# Patient Record
Sex: FEMALE | Race: WHITE | Employment: OTHER | URBAN - METROPOLITAN AREA
[De-identification: names, ages, dates, MRNs, and addresses within clinical notes are randomized per-mention and may not be internally consistent; named-entity substitution may affect disease eponyms.]

---

## 2021-10-24 ENCOUNTER — HOSPITAL ENCOUNTER (EMERGENCY)
Age: 36
Discharge: HOME | End: 2021-10-25
Attending: EMERGENCY MEDICINE | Admitting: EMERGENCY MEDICINE

## 2021-10-24 DIAGNOSIS — M54.6 ACUTE BILATERAL THORACIC BACK PAIN: Primary | ICD-10-CM

## 2021-10-24 DIAGNOSIS — R10.819 ABDOMINAL TENDERNESS IN FLANK: ICD-10-CM

## 2021-10-24 LAB
ALBUMIN SERPL-MCNC: 4.1 G/DL (ref 3.5–5)
ALBUMIN/GLOB SERPL: 1.1 {RATIO} (ref 1.1–2.2)
ALP SERPL-CCNC: 79 U/L (ref 45–117)
ALT SERPL-CCNC: 17 U/L (ref 12–78)
ANION GAP SERPL CALC-SCNC: 6 MMOL/L (ref 5–15)
APPEARANCE UR: CLEAR
AST SERPL-CCNC: 11 U/L (ref 15–37)
BACTERIA URNS QL MICRO: NEGATIVE /HPF
BASOPHILS # BLD: 0.1 K/UL (ref 0–0.1)
BASOPHILS NFR BLD: 1 % (ref 0–1)
BILIRUB SERPL-MCNC: 0.3 MG/DL (ref 0.2–1)
BILIRUB UR QL: NEGATIVE
BUN SERPL-MCNC: 13 MG/DL (ref 6–20)
BUN/CREAT SERPL: 18 (ref 12–20)
CALCIUM SERPL-MCNC: 9.4 MG/DL (ref 8.5–10.1)
CHLORIDE SERPL-SCNC: 104 MMOL/L (ref 97–108)
CO2 SERPL-SCNC: 27 MMOL/L (ref 21–32)
COLOR UR: NORMAL
COMMENT, HOLDF: NORMAL
CREAT SERPL-MCNC: 0.71 MG/DL (ref 0.55–1.02)
DIFFERENTIAL METHOD BLD: NORMAL
EOSINOPHIL # BLD: 0.2 K/UL (ref 0–0.4)
EOSINOPHIL NFR BLD: 3 % (ref 0–7)
EPITH CASTS URNS QL MICRO: NORMAL /LPF
ERYTHROCYTE [DISTWIDTH] IN BLOOD BY AUTOMATED COUNT: 12.6 % (ref 11.5–14.5)
GLOBULIN SER CALC-MCNC: 3.9 G/DL (ref 2–4)
GLUCOSE SERPL-MCNC: 96 MG/DL (ref 65–100)
GLUCOSE UR STRIP.AUTO-MCNC: NEGATIVE MG/DL
HCT VFR BLD AUTO: 42.8 % (ref 35–47)
HGB BLD-MCNC: 13.5 G/DL (ref 11.5–16)
HGB UR QL STRIP: NEGATIVE
HYALINE CASTS URNS QL MICRO: NORMAL /LPF (ref 0–5)
IMM GRANULOCYTES # BLD AUTO: 0 K/UL (ref 0–0.04)
IMM GRANULOCYTES NFR BLD AUTO: 0 % (ref 0–0.5)
KETONES UR QL STRIP.AUTO: NEGATIVE MG/DL
LEUKOCYTE ESTERASE UR QL STRIP.AUTO: NEGATIVE
LIPASE SERPL-CCNC: 133 U/L (ref 73–393)
LYMPHOCYTES # BLD: 3.3 K/UL (ref 0.8–3.5)
LYMPHOCYTES NFR BLD: 38 % (ref 12–49)
MCH RBC QN AUTO: 28.4 PG (ref 26–34)
MCHC RBC AUTO-ENTMCNC: 31.5 G/DL (ref 30–36.5)
MCV RBC AUTO: 90.1 FL (ref 80–99)
MONOCYTES # BLD: 0.7 K/UL (ref 0–1)
MONOCYTES NFR BLD: 8 % (ref 5–13)
NEUTS SEG # BLD: 4.4 K/UL (ref 1.8–8)
NEUTS SEG NFR BLD: 50 % (ref 32–75)
NITRITE UR QL STRIP.AUTO: NEGATIVE
NRBC # BLD: 0 K/UL (ref 0–0.01)
NRBC BLD-RTO: 0 PER 100 WBC
PH UR STRIP: 7 [PH] (ref 5–8)
PLATELET # BLD AUTO: 306 K/UL (ref 150–400)
PMV BLD AUTO: 10.1 FL (ref 8.9–12.9)
POTASSIUM SERPL-SCNC: 3.4 MMOL/L (ref 3.5–5.1)
PROT SERPL-MCNC: 8 G/DL (ref 6.4–8.2)
PROT UR STRIP-MCNC: NEGATIVE MG/DL
RBC # BLD AUTO: 4.75 M/UL (ref 3.8–5.2)
RBC #/AREA URNS HPF: NORMAL /HPF (ref 0–5)
SAMPLES BEING HELD,HOLD: NORMAL
SODIUM SERPL-SCNC: 137 MMOL/L (ref 136–145)
SP GR UR REFRACTOMETRY: <1.005 (ref 1–1.03)
UR CULT HOLD, URHOLD: NORMAL
UROBILINOGEN UR QL STRIP.AUTO: 0.2 EU/DL (ref 0.2–1)
WBC # BLD AUTO: 8.8 K/UL (ref 3.6–11)
WBC URNS QL MICRO: NORMAL /HPF (ref 0–4)

## 2021-10-24 PROCEDURE — 83690 ASSAY OF LIPASE: CPT

## 2021-10-24 PROCEDURE — 81001 URINALYSIS AUTO W/SCOPE: CPT

## 2021-10-24 PROCEDURE — 99284 EMERGENCY DEPT VISIT MOD MDM: CPT

## 2021-10-24 PROCEDURE — 85025 COMPLETE CBC W/AUTO DIFF WBC: CPT

## 2021-10-24 PROCEDURE — 80053 COMPREHEN METABOLIC PANEL: CPT

## 2021-10-25 VITALS
OXYGEN SATURATION: 100 % | DIASTOLIC BLOOD PRESSURE: 77 MMHG | RESPIRATION RATE: 16 BRPM | SYSTOLIC BLOOD PRESSURE: 127 MMHG | TEMPERATURE: 98.3 F | HEART RATE: 65 BPM

## 2021-10-25 LAB — HCG UR QL: NEGATIVE

## 2021-10-25 PROCEDURE — 81025 URINE PREGNANCY TEST: CPT

## 2021-10-25 PROCEDURE — 74011250637 HC RX REV CODE- 250/637: Performed by: PHYSICIAN ASSISTANT

## 2021-10-25 RX ORDER — IBUPROFEN 400 MG/1
800 TABLET ORAL
Status: COMPLETED | OUTPATIENT
Start: 2021-10-25 | End: 2021-10-25

## 2021-10-25 RX ADMIN — IBUPROFEN 800 MG: 400 TABLET ORAL at 00:36

## 2021-10-25 NOTE — ED TRIAGE NOTES
Pt arrives with CC of mid-upper back pain starting at 130p today that started suddenly after a road trip. She feels like it is muscle spasms. Denies difficulty voiding, frequency/urgency, but she was also concerned about a kidney issue. She is also having some abd pain, mostly R sided, sometimes associated with the back pain. She complains of some nausea and chills.

## 2021-10-25 NOTE — DISCHARGE INSTRUCTIONS
Return for new or worsening symptoms such as fever, worsening pain with blood in your urine, painful urination, increased abdominal pain, vomiting, chest pain, shortness of breath, coughing up blood. If you choose to take medication, you may take ibuprofen, 2 to 3 tablets every 6-8 hours, or Tylenol, 500 mg every 4-6 hours, as needed for pain. Follow-up with primary care.

## 2021-10-25 NOTE — ED PROVIDER NOTES
29year old F presenting to the ED for back pain. Started relatively abruptly after driving home from [address] today. Notes that she got out of her car, walked around to get her stuff, and had acute onset of sharp pain that then escalated. Thought it was muscular, tried a foam roller, notes that pain has persisted. Notes that she had slight relief of pain when she ate soup.  + nausea, felt a little woozy. Also notes some abdominal discomfort. Tried to sleep but was too uncomfortable. + chills, no fever. Points to the left side of the abdomen, but when she has the back pain the abd pain is in the RUQ; mid back, into the ribs and torso. Notes that pain is worse with movement, twisting, lifting the arms. Denies dysuria, hematuria, hesitancy - notes that earlier had some slight worsening of back pain with urination. Just finished menstrual cycle. No meds PTA. Declines pain medication at this time. PMHx: denies  PSx: wisdom teeth, minor gynecologic procedures  Social: non-smoker. 0-1 drinks a month. 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 Massage therapist.  Allergies: PCN, amox           No past medical history on file. No past surgical history on file. No family history on file.     Social History     Socioeconomic History    Marital status: SINGLE     Spouse name: Not on file    Number of children: Not on file    Years of education: Not on file    Highest education level: Not on file   Occupational History    Not on file   Tobacco Use    Smoking status: Not on file   Substance and Sexual Activity    Alcohol use: Not on file    Drug use: Not on file    Sexual activity: Not on file   Other Topics Concern    Not on file   Social History Narrative    Not on file     Social Determinants of Health     Financial Resource Strain:     Difficulty of Paying Living Expenses:    Food Insecurity:     Worried About Running Out of Food in the Last Year:     [address] in the Last Year:    Transportation Needs:     Lack of Transportation (Medical):  Lack of Transportation (Non-Medical):    Physical Activity:     Days of Exercise per Week:     Minutes of Exercise per Session:    Stress:     Feeling of Stress :    Social Connections:     Frequency of Communication with Friends and Family:     Frequency of Social Gatherings with Friends and Family:     Attends [employer] Services:     Active Member of Clubs or Organizations:     Attends Club or Organization Meetings:     Marital Status:    Intimate Partner Violence:     Fear of Current or Ex-Partner:     Emotionally Abused:     Physically Abused:     Sexually Abused: ALLERGIES: Amoxicillin and Penicillins    Review of Systems   Constitutional: Negative for fever. HENT: Negative for facial swelling. Respiratory: Negative for shortness of breath. Cardiovascular: Negative for chest pain. Gastrointestinal: Positive for abdominal pain. Negative for vomiting. Genitourinary: Positive for flank pain. Musculoskeletal: Positive for back pain. Skin: Negative for wound. Neurological: Negative for syncope. All other systems reviewed and are negative. Vitals:    10/24/21 2236   BP: (!) 145/83   Pulse: 78   Resp: 18   Temp: 98.3 °F (36.8 °C)   SpO2: 100%            Physical Exam  Vitals and nursing note reviewed. Constitutional:       General: She is not in acute distress. Appearance: She is well-developed. Comments: Pleasant white female, no distress   HENT:      Head: Normocephalic and atraumatic. Right Ear: External ear normal.      Left Ear: External ear normal.   Eyes:      General: No scleral icterus. Conjunctiva/sclera: Conjunctivae normal.   Neck:      Trachea: No tracheal deviation. Cardiovascular:      Rate and Rhythm: Normal rate and regular rhythm. Heart sounds: Normal heart sounds. No murmur heard. No friction rub. No gallop.     Pulmonary:      Effort: Pulmonary effort is normal. No respiratory distress. Breath sounds: Normal breath sounds. No stridor. No wheezing. Abdominal:      General: There is no distension. Palpations: Abdomen is soft. Comments: Mild tenderness over both flanks  Abdomen is soft, very mild epigastric tenderness to deep palpation, no right upper or left upper quadrant tenderness, negative Maxwell sign   Musculoskeletal:         General: Normal range of motion. Cervical back: Neck supple. Back:    Skin:     General: Skin is warm and dry. Neurological:      Mental Status: She is alert and oriented to person, place, and time. Psychiatric:         Behavior: Behavior normal.          MDM  Number of Diagnoses or Management Options  Abdominal tenderness in flank  Acute bilateral thoracic back pain  Diagnosis management comments: 43-year-old female presenting to the ED for 1 day history of lower thoracic bilateral back pain, especially worse with bending, twisting, movement of the arms. Given the patient complained of some nausea, intermittent abdominal discomfort with pain, labs and urinalysis were checked, reassuring and within normal limits. Had discussion with patient that given normal labs, urinalysis, reproducible nature of pain with palpation and movement of the arms, symptoms were most likely consistent with muscular pain of the back. No vital signs, lab findings, symptoms, risk factors concerning for pyelonephritis, renal colic, PE, etc.  No focal right upper quadrant pain or tenderness on exam, normal LFTs. Discussed with patient that without imaging, could not for sure rule out gallstones, kidney stones, etc.  Patient voiced understanding, declined any imaging, notes that she felt reassured with work-up to this point and would like to monitor her symptoms and return if things worsen. Declined prescription for NSAID or muscle relaxer.        Amount and/or Complexity of Data Reviewed  Clinical lab tests: ordered and reviewed  Discuss the patient with other providers: yes (Dr. Jarret Harley ED attending)           Procedures

## 2021-10-25 NOTE — ED NOTES
MD  reviewed discharge instructions and options with patient. Patient verbalized understanding. RN reviewed discharge instructions using teach back method. Patient ambulatory to exit without difficulty and no acute signs of distress. No complaints or needs expressed at this time. Vital signs stable. Patient to follow up with PCP in the morning for appointment.